# Patient Record
Sex: FEMALE | Race: WHITE
[De-identification: names, ages, dates, MRNs, and addresses within clinical notes are randomized per-mention and may not be internally consistent; named-entity substitution may affect disease eponyms.]

---

## 2019-08-05 LAB
ANION GAP SERPL CALC-SCNC: 11 MMOL/L (ref 7–16)
BUN SERPL-MCNC: 14 MG/DL (ref 7–17)
CALCIUM SERPL-MCNC: 9.4 MG/DL (ref 8.4–10.2)
CHLORIDE SERPL-SCNC: 103 MMOL/L (ref 98–107)
CO2 SERPL-SCNC: 27 MMOL/L (ref 22–30)
CREAT SERPL-SCNC: 0.68 UMOL/L (ref 0.52–1.25)
ERYTHROCYTE [DISTWIDTH] IN BLOOD BY AUTOMATED COUNT: 13.2 % (ref 11.5–14.5)
GLUCOSE SERPL-MCNC: 165 MG/DL (ref 74–106)
HCT VFR BLD AUTO: 42.7 % (ref 37–47)
HGB BLD-MCNC: 13.9 G/DL (ref 12.5–16)
MCH RBC QN AUTO: 28 PG (ref 27–31)
MCHC RBC AUTO-ENTMCNC: 33 G/DL (ref 33–37)
MCV RBC AUTO: 86 FL (ref 80–100)
PLATELET # BLD AUTO: 318 K/MM3 (ref 130–400)
PMV BLD AUTO: 9.4 FL (ref 7.4–10.4)
POTASSIUM SERPL-SCNC: 4.1 MMOL/L (ref 3.4–5)
RBC # BLD AUTO: 4.95 M/MM3 (ref 4.1–5.3)
SODIUM SERPL-SCNC: 140 MMOL/L (ref 137–145)

## 2019-08-06 ENCOUNTER — HOSPITAL ENCOUNTER (OUTPATIENT)
Dept: HOSPITAL 19 - SDCO | Age: 69
LOS: 1 days | Discharge: HOME | End: 2019-08-07
Attending: UROLOGY
Payer: MEDICARE

## 2019-08-06 VITALS — HEART RATE: 47 BPM | DIASTOLIC BLOOD PRESSURE: 47 MMHG | SYSTOLIC BLOOD PRESSURE: 121 MMHG | TEMPERATURE: 97.6 F

## 2019-08-06 VITALS — HEART RATE: 51 BPM | DIASTOLIC BLOOD PRESSURE: 56 MMHG | SYSTOLIC BLOOD PRESSURE: 116 MMHG

## 2019-08-06 VITALS — BODY MASS INDEX: 29.1 KG/M2 | HEIGHT: 67 IN | WEIGHT: 185.41 LBS

## 2019-08-06 VITALS — TEMPERATURE: 98.1 F | SYSTOLIC BLOOD PRESSURE: 147 MMHG | DIASTOLIC BLOOD PRESSURE: 71 MMHG | HEART RATE: 72 BPM

## 2019-08-06 VITALS — HEART RATE: 51 BPM | DIASTOLIC BLOOD PRESSURE: 52 MMHG | SYSTOLIC BLOOD PRESSURE: 119 MMHG

## 2019-08-06 VITALS — HEART RATE: 70 BPM | TEMPERATURE: 97.6 F | SYSTOLIC BLOOD PRESSURE: 130 MMHG | DIASTOLIC BLOOD PRESSURE: 50 MMHG

## 2019-08-06 VITALS — HEART RATE: 64 BPM | DIASTOLIC BLOOD PRESSURE: 60 MMHG | SYSTOLIC BLOOD PRESSURE: 135 MMHG | TEMPERATURE: 98.2 F

## 2019-08-06 VITALS — HEART RATE: 47 BPM | DIASTOLIC BLOOD PRESSURE: 53 MMHG | SYSTOLIC BLOOD PRESSURE: 119 MMHG

## 2019-08-06 VITALS — SYSTOLIC BLOOD PRESSURE: 117 MMHG | DIASTOLIC BLOOD PRESSURE: 57 MMHG | TEMPERATURE: 98.1 F | HEART RATE: 48 BPM

## 2019-08-06 VITALS — SYSTOLIC BLOOD PRESSURE: 136 MMHG | DIASTOLIC BLOOD PRESSURE: 51 MMHG | HEART RATE: 83 BPM | TEMPERATURE: 97.9 F

## 2019-08-06 VITALS — DIASTOLIC BLOOD PRESSURE: 56 MMHG | HEART RATE: 52 BPM | SYSTOLIC BLOOD PRESSURE: 116 MMHG

## 2019-08-06 VITALS — HEART RATE: 52 BPM | DIASTOLIC BLOOD PRESSURE: 55 MMHG | SYSTOLIC BLOOD PRESSURE: 125 MMHG

## 2019-08-06 VITALS — TEMPERATURE: 98.2 F | SYSTOLIC BLOOD PRESSURE: 125 MMHG | DIASTOLIC BLOOD PRESSURE: 56 MMHG | HEART RATE: 52 BPM

## 2019-08-06 DIAGNOSIS — E11.9: ICD-10-CM

## 2019-08-06 DIAGNOSIS — Z79.4: ICD-10-CM

## 2019-08-06 DIAGNOSIS — Z90.710: ICD-10-CM

## 2019-08-06 DIAGNOSIS — I10: ICD-10-CM

## 2019-08-06 DIAGNOSIS — N99.3: Primary | ICD-10-CM

## 2019-08-06 DIAGNOSIS — Z82.49: ICD-10-CM

## 2019-08-06 DIAGNOSIS — Z83.3: ICD-10-CM

## 2019-08-06 PROCEDURE — A9284 NON-ELECTRONIC SPIROMETER: HCPCS

## 2019-08-06 PROCEDURE — A4314 CATH W/DRAINAGE 2-WAY LATEX: HCPCS

## 2019-08-06 PROCEDURE — C1781 MESH (IMPLANTABLE): HCPCS

## 2019-08-06 NOTE — NUR
Patient in bed, is alert and oriented x4. Has SL to right inner forearm
without redness or swelling. Has Robotic lap sites to abdomen x5 dry and
glued. Bill to BSD with yellow urine, ermelinda pad and mesh panties on with no
drainage noted. Denies pain or N/V. Takes HS meds without problem. Spouse at
bedside.

## 2019-08-06 NOTE — NUR
PATIENT ADMITED INTO ROOM 321-2 POST OP ROBOTIC SACROCOLPOPEXY. ABDOMINAL LAP
SITES X5 WELL APPROXIMATED WITH SWIFTSET CLOSURE. ABDOMIN IS ROUND, SOFT AND
WITH POSITIVE BOWL SOUNDS. MESH UNDIES AND RAFA PAD INPLACE. SCHMID TO
DEPENDENT DRAINAGE WITH SMALL AMOUNTS OF CLEAR YELLOW URINE NOTED. IV FLUIDS
INFUSING INTO RIGHT FORARM IV. NO C/O N/V. PATIENT TOLERATING ICE CHIPS WELL.
PACU BS . HEAD TO TOE ASSESSMENT WNL.  AT BEDSIDE. ORIENTED TO
ROOM. CALL LIGHT IN REACH.

## 2019-08-07 VITALS — HEART RATE: 61 BPM | SYSTOLIC BLOOD PRESSURE: 129 MMHG | DIASTOLIC BLOOD PRESSURE: 54 MMHG | TEMPERATURE: 97.8 F

## 2019-08-07 VITALS — HEART RATE: 61 BPM | SYSTOLIC BLOOD PRESSURE: 133 MMHG | DIASTOLIC BLOOD PRESSURE: 63 MMHG | TEMPERATURE: 98.1 F

## 2019-08-07 NOTE — NUR
NIEVES met with patient and  to discuss discharge planning. Patient reports
she will discharge later today. Patient lives independently at home with her
. Patient's PCP is LEIA Galeas and she obtains prescriptions
from [facility]. Patient does not use any DME or home health and is
independent with all ADLs. Patient's  reports patient does have a
[facility]PO[facility]. No discharge needs.

## 2019-08-07 NOTE — NUR
Patient denies pain. Has had great urine output this shift. IV site patent to
right forearm, IV antibiotic infusing without redness or swelling. Patient
anxious to have blanco catheter dc'd today.

## 2019-08-07 NOTE — NUR
Patient alert and oriented, answers questions appropriately.  See assessment.
Abdomen soft, non tender, non distended.  Bowel sounds active x4 quads.
+Flatus. Lap sites with edges well approximated, no redness or drainage noted.
 Bill catheter removed at 0800 this am, has voided 350ml clear yellow urine.
No c/o at this time.

## 2019-08-07 NOTE — NUR
Report recieved from Carla WEBB. blanco catheter DC per doctors orders.
aspirated 6 ml from balloon. catheter removed with no issues. patient teaching
regarding bleeding, ambulating in ferro, and use of hat. patient in bed
watching tv.  at bedside.

## 2019-08-07 NOTE — NUR
Discharge instructions reviewed with patient and spouse, verbalized
understanding.  Discharged via wheelchair to auto/home with spouse at 1155.

## 2019-08-07 NOTE — NUR
Initial visit; Patient thanked  for looking in on her and offering
God's blessings and letting her know she will be offered Holy Communion while
she is here at [facility].

## 2021-09-27 ENCOUNTER — HOSPITAL ENCOUNTER (OUTPATIENT)
Dept: HOSPITAL 19 - MHCPAIN | Age: 71
End: 2021-09-27
Attending: ANESTHESIOLOGY
Payer: MEDICARE

## 2021-09-27 DIAGNOSIS — M54.16: ICD-10-CM

## 2021-09-27 DIAGNOSIS — G89.29: ICD-10-CM

## 2021-09-27 DIAGNOSIS — M53.3: ICD-10-CM

## 2021-09-27 DIAGNOSIS — M47.817: Primary | ICD-10-CM

## 2021-09-27 PROCEDURE — G0463 HOSPITAL OUTPT CLINIC VISIT: HCPCS

## 2021-10-14 ENCOUNTER — HOSPITAL ENCOUNTER (OUTPATIENT)
Dept: HOSPITAL 19 - MHCPAIN | Age: 71
End: 2021-10-14
Attending: ANESTHESIOLOGY
Payer: MEDICARE

## 2021-10-21 ENCOUNTER — HOSPITAL ENCOUNTER (OUTPATIENT)
Dept: HOSPITAL 19 - MHCPAIN | Age: 71
End: 2021-10-21
Attending: ANESTHESIOLOGY
Payer: MEDICARE

## 2021-10-21 DIAGNOSIS — M54.16: ICD-10-CM

## 2021-10-21 DIAGNOSIS — M53.3: ICD-10-CM

## 2021-10-21 DIAGNOSIS — M47.816: Primary | ICD-10-CM

## 2021-11-02 ENCOUNTER — HOSPITAL ENCOUNTER (OUTPATIENT)
Dept: HOSPITAL 19 - MHCPAIN | Age: 71
End: 2021-11-02
Attending: ANESTHESIOLOGY
Payer: MEDICARE

## 2021-11-02 DIAGNOSIS — M54.16: Primary | ICD-10-CM

## 2021-11-02 DIAGNOSIS — M47.817: ICD-10-CM

## 2021-11-02 DIAGNOSIS — M53.3: ICD-10-CM

## 2021-11-02 PROCEDURE — G0463 HOSPITAL OUTPT CLINIC VISIT: HCPCS

## 2021-11-10 ENCOUNTER — HOSPITAL ENCOUNTER (OUTPATIENT)
Dept: HOSPITAL 19 - MHCPAIN | Age: 71
End: 2021-11-10
Attending: ANESTHESIOLOGY
Payer: MEDICARE

## 2021-11-10 DIAGNOSIS — M47.817: Primary | ICD-10-CM

## 2021-11-10 DIAGNOSIS — M53.3: ICD-10-CM

## 2021-11-10 DIAGNOSIS — M54.17: ICD-10-CM

## 2021-11-30 ENCOUNTER — HOSPITAL ENCOUNTER (OUTPATIENT)
Dept: HOSPITAL 19 - MHCPAIN | Age: 71
End: 2021-11-30
Attending: ANESTHESIOLOGY
Payer: MEDICARE

## 2021-11-30 DIAGNOSIS — M47.816: Primary | ICD-10-CM

## 2021-11-30 DIAGNOSIS — M54.16: ICD-10-CM

## 2021-11-30 DIAGNOSIS — G89.29: ICD-10-CM

## 2021-11-30 PROCEDURE — G0463 HOSPITAL OUTPT CLINIC VISIT: HCPCS
